# Patient Record
Sex: FEMALE | Race: WHITE | NOT HISPANIC OR LATINO | Employment: FULL TIME | URBAN - NONMETROPOLITAN AREA
[De-identification: names, ages, dates, MRNs, and addresses within clinical notes are randomized per-mention and may not be internally consistent; named-entity substitution may affect disease eponyms.]

---

## 2023-05-27 ENCOUNTER — HOSPITAL ENCOUNTER (OUTPATIENT)
Facility: HOSPITAL | Age: 33
Discharge: HOME | End: 2023-05-27
Attending: STUDENT IN AN ORGANIZED HEALTH CARE EDUCATION/TRAINING PROGRAM | Admitting: STUDENT IN AN ORGANIZED HEALTH CARE EDUCATION/TRAINING PROGRAM

## 2023-05-27 VITALS
RESPIRATION RATE: 18 BRPM | SYSTOLIC BLOOD PRESSURE: 138 MMHG | HEART RATE: 77 BPM | OXYGEN SATURATION: 100 % | DIASTOLIC BLOOD PRESSURE: 86 MMHG | TEMPERATURE: 98 F

## 2023-05-27 LAB
A1 MICROGLOB PLACENTAL VAG QL: NEGATIVE
B-HCG UR QL: NEGATIVE

## 2023-05-27 PROCEDURE — G0463 HOSPITAL OUTPT CLINIC VISIT: HCPCS

## 2023-05-27 PROCEDURE — 84112 EVAL AMNIOTIC FLUID PROTEIN: CPT | Performed by: STUDENT IN AN ORGANIZED HEALTH CARE EDUCATION/TRAINING PROGRAM

## 2023-05-27 PROCEDURE — 81025 URINE PREGNANCY TEST: CPT | Performed by: STUDENT IN AN ORGANIZED HEALTH CARE EDUCATION/TRAINING PROGRAM

## 2023-05-27 RX ORDER — MECOBALAMIN 5000 MCG
15 TABLET,DISINTEGRATING ORAL 2 TIMES DAILY
COMMUNITY

## 2023-05-27 NOTE — NON STRESS TEST
Triage Note - Nursing Documentation  Labor and Delivery Admission Log    Avril Che  : 1990  MRN: [id number]  CSN: [id number]    Date in / Time in:  2023  Time in: 926    Date out / Time out:  2023  Time out: 1017    Nurse: Katharine Maria RN    Patient Info: She is a 33 y.o. year old  at Unknown with an MEHUL of Not found. who was seen on the [facility].    Chief Complaint:   Chief Complaint   Patient presents with   • Abdominal Cramping     Started around 0730       Provider Instructions / Disposition: PO HYDRATION, PT CO ABD CRAMPING, PT STATES SHE HAS NOT HAD A PRENATAL VISIT OR A CONFIRMED PREGNANCY TEST. UNABLE TO OBTAIN FHR, US AT BS BY MD, PAMG NEGATIVE, UPT NEGATIVE. PT DC IN STABLE CONDITION. PT DENIES NEED TO GO TO ER AT THIS TIME. RN EDUCATED PT ER IS AVAILABLE IF SHE FEELS SHE NEEDS FURTHER EVALUATION. PT VU.     There is no problem list on file for this patient.      NST Documentation (Only applicable > 32 weeks):  N\A

## 2023-05-27 NOTE — NURSING NOTE
Pt states she was traveling home when her car broke down. Pt states she had been experiencing contractions since 0730 this morning. Pt arrived via ambulance. Pt states she is pregnant and is in labor, pt states feels leaking of fluid. PAMG negative. Pt states she is unsure when her last menstrual period was but that she is very regular. Pt unsure how many weeks pregnant she is and states she has not had a prenatal visit or confirmed pregnancy test. UPT ordered by MD. UPT negative. MD states pt can be dc to ER if she feels she needs to be seen for any further  abdominal cramping. Pt DC for LH in stable condition.